# Patient Record
Sex: FEMALE | Race: WHITE
[De-identification: names, ages, dates, MRNs, and addresses within clinical notes are randomized per-mention and may not be internally consistent; named-entity substitution may affect disease eponyms.]

---

## 2018-01-06 ENCOUNTER — HOSPITAL ENCOUNTER (EMERGENCY)
Dept: HOSPITAL 54 - ER | Age: 68
Discharge: HOME | End: 2018-01-06
Payer: MEDICARE

## 2018-01-06 VITALS — WEIGHT: 143 LBS | BODY MASS INDEX: 23.82 KG/M2 | HEIGHT: 65 IN

## 2018-01-06 VITALS — DIASTOLIC BLOOD PRESSURE: 70 MMHG | SYSTOLIC BLOOD PRESSURE: 118 MMHG

## 2018-01-06 DIAGNOSIS — I10: ICD-10-CM

## 2018-01-06 DIAGNOSIS — L50.1: ICD-10-CM

## 2018-01-06 DIAGNOSIS — M81.0: ICD-10-CM

## 2018-01-06 DIAGNOSIS — Z90.49: ICD-10-CM

## 2018-01-06 DIAGNOSIS — F17.290: ICD-10-CM

## 2018-01-06 DIAGNOSIS — J20.9: Primary | ICD-10-CM

## 2018-01-06 PROCEDURE — Z7610: HCPCS

## 2018-01-06 PROCEDURE — A4606 OXYGEN PROBE USED W OXIMETER: HCPCS

## 2019-04-01 ENCOUNTER — OFFICE (OUTPATIENT)
Dept: URBAN - METROPOLITAN AREA CLINIC 67 | Facility: CLINIC | Age: 69
End: 2019-04-01

## 2019-04-01 VITALS — DIASTOLIC BLOOD PRESSURE: 90 MMHG | HEIGHT: 61 IN | WEIGHT: 150 LBS | SYSTOLIC BLOOD PRESSURE: 148 MMHG

## 2019-04-01 DIAGNOSIS — K21.9 GERD: ICD-10-CM

## 2019-04-01 DIAGNOSIS — K29.70 GASTRITIS: ICD-10-CM

## 2019-04-01 DIAGNOSIS — Z12.11 SCREENING FOR COLON CANCER: ICD-10-CM

## 2019-04-01 DIAGNOSIS — Z86.010 PERSONAL HISTORY COLON POLYPS: ICD-10-CM

## 2019-04-01 DIAGNOSIS — R14.0 BLOATING SYMPTOM: ICD-10-CM

## 2019-04-01 PROCEDURE — 99243 OFF/OP CNSLTJ NEW/EST LOW 30: CPT | Performed by: NURSE PRACTITIONER

## 2019-04-01 NOTE — SERVICEHPINOTES
This is a   68   year old  female   seen   in consultation at the request of Dr. NAKUL CALVIN  . Patient presents for a screening colonoscopy in light of personal hx of colon polyps at her last colonoscopy 5 yrs ago. Was told to have repeat procedure after 5 yrs.The patient has no family history of colon cancer or other significant GI diseases. Patient denies fever, nausea, vomiting, dysphagia, abdominal pain, change in bowel habits, constipation, diarrhea, rectal bleeding, melena, and significant change in weight. Reports occasional breakthrough heartburn despite qam Omeprazole 40 mg and bloating. had an EGD 2016 significant for gastritis endoscopically. No bx results available for review. Patient s/p CCY since her last colonoscopy. Also hysterectomy and chemo/XRT for cervical cancer 2013. Denies shortness of breath and chest pain.

## 2019-04-11 ENCOUNTER — AMBULATORY SURGICAL CENTER (OUTPATIENT)
Dept: URBAN - METROPOLITAN AREA SURGERY 42 | Facility: SURGERY | Age: 69
End: 2019-04-11

## 2019-04-11 VITALS
OXYGEN SATURATION: 97 % | TEMPERATURE: 97.7 F | RESPIRATION RATE: 14 BRPM | HEART RATE: 76 BPM | WEIGHT: 150 LBS | HEIGHT: 61 IN | DIASTOLIC BLOOD PRESSURE: 67 MMHG | SYSTOLIC BLOOD PRESSURE: 144 MMHG

## 2019-04-11 DIAGNOSIS — R14.0 ABDOMINAL DISTENSION (GASEOUS): ICD-10-CM

## 2019-04-11 DIAGNOSIS — D12.3 BENIGN NEOPLASM OF TRANSVERSE COLON: ICD-10-CM

## 2019-04-11 DIAGNOSIS — D12.5 BENIGN NEOPLASM OF SIGMOID COLON: ICD-10-CM

## 2019-04-11 DIAGNOSIS — K21.9 GASTRO-ESOPHAGEAL REFLUX DISEASE WITHOUT ESOPHAGITIS: ICD-10-CM

## 2019-04-11 PROBLEM — K31.89 OTHER DISEASES OF STOMACH AND DUODENUM: Status: ACTIVE | Noted: 2019-04-11

## 2019-04-11 PROBLEM — K29.70 GASTRITIS, UNSPECIFIED, WITHOUT BLEEDING: Status: ACTIVE | Noted: 2019-04-11

## 2019-04-11 LAB — SURGICAL: PDF REPORT: (no result)

## 2019-04-11 PROCEDURE — 45385 COLONOSCOPY W/LESION REMOVAL: CPT | Performed by: INTERNAL MEDICINE

## 2019-04-11 PROCEDURE — 45380 COLONOSCOPY AND BIOPSY: CPT | Mod: 59 | Performed by: INTERNAL MEDICINE

## 2019-04-11 PROCEDURE — 43239 EGD BIOPSY SINGLE/MULTIPLE: CPT | Performed by: INTERNAL MEDICINE

## 2019-04-11 NOTE — SERVICEHPINOTES
This is a 68 year old female seen in consultation at the request of Dr. NAKUL CALVIN. Patient presents for a screening colonoscopy in light of personal hx of colon polyps at her last colonoscopy 5 yrs ago. Was told to have repeat procedure after 5 yrs.The patient has no family history of colon cancer or other significant GI diseases. Patient denies fever, nausea, vomiting, dysphagia, abdominal pain, change in bowel habits, constipation, diarrhea, rectal bleeding, melena, and significant change in weight. Reports occasional breakthrough heartburn despite qam Omeprazole 40 mg and bloating. had an EGD 2016 significant for gastritis endoscopically. No bx results available for review. Patient s/p CCY since her last colonoscopy. Also hysterectomy and chemo/XRT for cervical cancer 2013. Denies shortness of breath and chest pain.

## 2021-03-05 ENCOUNTER — OFFICE (OUTPATIENT)
Dept: URBAN - METROPOLITAN AREA CLINIC 67 | Facility: CLINIC | Age: 71
End: 2021-03-05

## 2021-03-05 VITALS
SYSTOLIC BLOOD PRESSURE: 144 MMHG | DIASTOLIC BLOOD PRESSURE: 81 MMHG | HEIGHT: 61 IN | WEIGHT: 141 LBS | TEMPERATURE: 97.7 F

## 2021-03-05 DIAGNOSIS — D12.3 BENIGN NEOPLASM OF TRANSVERSE COLON: ICD-10-CM

## 2021-03-05 DIAGNOSIS — D12.2 BENIGN NEOPLASM OF ASCENDING COLON: ICD-10-CM

## 2021-03-05 DIAGNOSIS — D12.5 BENIGN NEOPLASM OF SIGMOID COLON: ICD-10-CM

## 2021-03-05 DIAGNOSIS — K21.9 GERD: ICD-10-CM

## 2021-03-05 PROCEDURE — 99213 OFFICE O/P EST LOW 20 MIN: CPT | Performed by: INTERNAL MEDICINE

## 2021-03-05 NOTE — SERVICEHPINOTES
This is a 70 year old female who had a 4/2019 EGD and colonoscopy in 4/2019: The patient did have four benign polyps found and removed. Although the prep was good, but not excellent, a two year follow up exam rather than a three year follow up is recommended. Because of the patient's history of radiation to the pelvis in 2013 for her cervical cancer, the upper scope was used. At the time of the next colonoscopy, having the long upper endoscope available, and even the single balloon scope, is recommended. She reports occasional breakthrough heartburn despite q am Dexilant. Had an EGD 2016 significant for gastritis endoscopically. Also hysterectomy and chemo/XRT for cervical cancer 2013. Denies shortness of breath and chest pain.

## 2022-11-17 ENCOUNTER — OFFICE (OUTPATIENT)
Dept: URBAN - METROPOLITAN AREA CLINIC 67 | Facility: CLINIC | Age: 72
End: 2022-11-17

## 2022-11-17 VITALS — HEIGHT: 61 IN | DIASTOLIC BLOOD PRESSURE: 70 MMHG | SYSTOLIC BLOOD PRESSURE: 138 MMHG | WEIGHT: 150 LBS

## 2022-11-17 DIAGNOSIS — K21.9: ICD-10-CM

## 2022-11-17 DIAGNOSIS — R22.2: ICD-10-CM

## 2022-11-17 DIAGNOSIS — Z86.010: ICD-10-CM

## 2022-11-17 PROCEDURE — 99214 OFFICE O/P EST MOD 30 MIN: CPT | Performed by: NURSE PRACTITIONER

## 2022-11-17 RX ORDER — OMEPRAZOLE 20 MG/1
40 CAPSULE, DELAYED RELEASE ORAL
Qty: 60 | Status: ACTIVE
Start: 2022-11-17

## 2022-11-17 NOTE — SERVICEHPINOTES
GLADYS JONES   returns today for follow-up from last visit on   4/16/2021  .   Last seen by Dr Jeffrey gentile/ following notes: This is a 70 year old female who had a 4/2019 EGD and colonoscopy in 4/2019: The patient did have four benign polyps found and removed. Although the prep was good, but not excellent, a two year follow up exam rather than a three year follow up is recommended. Because of the patient's history of radiation to the pelvis in 2013 for her cervical cancer, the upper scope was used. At the time of the next colonoscopy, having the long upper endoscope available, and even the single balloon scope, is recommended. She reports occasional breakthrough heartburn despite q am Dexilant. Had an EGD 2016 significant for gastritis endoscopically. Also hysterectomy and chemo/XRT for cervical cancer 2013. Denies shortness of breath and chest pain.
br
br On 4/20/2021 pt underwent both an EGD and a colonoscopy: a small hiatal hernia and mild gastritis, no significant abnormalities. Routine biopsies were taken of the stomach looking for H. pylori and negative after the patient's last colonoscopy in 2019 where she had polyps, though an imperfect prep, 17 benign polyps were found and removed, the patient did have an excellent prep, and the colon could be maneuvered with a pediatric colonoscope. With a very large number of polyps, being greater than 10, AGA guidelines recommend that the patient come back in 1 year.
br
br 11/17/22: Returns today, accompanied by daughter. Reports enlarging upper abd lump that is bothersome and heartburn despite qd Omeprazole.